# Patient Record
Sex: MALE | Race: WHITE | NOT HISPANIC OR LATINO | URBAN - METROPOLITAN AREA
[De-identification: names, ages, dates, MRNs, and addresses within clinical notes are randomized per-mention and may not be internally consistent; named-entity substitution may affect disease eponyms.]

---

## 2022-08-29 ENCOUNTER — HOSPITAL ENCOUNTER (OUTPATIENT)
Dept: TELEMEDICINE | Facility: HOSPITAL | Age: 52
Discharge: HOME | End: 2022-08-29
Payer: MEDICAID

## 2022-08-29 PROCEDURE — 99203 PR OFFICE/OUTPT VISIT, NEW, LEVL III, 30-44 MIN: ICD-10-PCS | Mod: 95,,, | Performed by: PSYCHIATRY & NEUROLOGY

## 2022-08-29 PROCEDURE — 99203 OFFICE O/P NEW LOW 30 MIN: CPT | Mod: 95,,, | Performed by: PSYCHIATRY & NEUROLOGY

## 2022-08-29 NOTE — SUBJECTIVE & OBJECTIVE
Woke up with symptoms?: no    Recent bleeding noted: no  Does the patient take any Blood Thinners? no  Medications: Antiplatelets:  none      Past Medical History: hypertension and seizures, alcohol abuse    Past Surgical History: no major surgeries within the last 2 weeks    Family History: no relevant history    Social History: drinking    Allergies: Allergies have not been reviewed     Review of Systems   Constitutional: Negative for chills, diaphoresis and fever.   HENT: Negative for hearing loss, tinnitus and trouble swallowing.    Eyes: Negative for visual disturbance.   Respiratory: Negative for shortness of breath.    Cardiovascular: Negative for chest pain and palpitations.   Gastrointestinal: Negative for blood in stool and vomiting.   Endocrine: Negative for cold intolerance.   Genitourinary: Negative for hematuria.   Musculoskeletal: Negative for gait problem and neck pain.   Skin: Negative for pallor and rash.   Neurological: Positive for speech difficulty and headaches. Negative for dizziness, facial asymmetry and weakness.   Hematological: Does not bruise/bleed easily.   Psychiatric/Behavioral: Negative for agitation, behavioral problems and confusion.     Objective:   Vitals: There were no vitals taken for this visit. {[facility]LE[facility] VITALS:91926}    CT READ: Yes  No hemmorhage. No mass effect. No early infarct signs.     Physical Exam  Vitals and nursing note reviewed.   Constitutional:       General: He is not in acute distress.     Appearance: Normal appearance.   HENT:      Head: Normocephalic and atraumatic.      Nose: Nose normal.   Eyes:      Extraocular Movements: Extraocular movements intact.   Cardiovascular:      Rate and Rhythm: Normal rate and regular rhythm.   Pulmonary:      Effort: No respiratory distress.   Abdominal:      General: There is no distension.   Genitourinary:     Comments: na  Musculoskeletal:      Cervical back: Normal range of motion.      Right lower leg: No edema.       Left lower leg: No edema.   Skin:     Findings: No erythema or rash.   Neurological:      Mental Status: He is alert and oriented to person, place, and time.      Cranial Nerves: No cranial nerve deficit.      Sensory: No sensory deficit.      Motor: No weakness.      Coordination: Coordination normal.   Psychiatric:         Mood and Affect: Mood normal.         Behavior: Behavior normal.

## 2022-08-29 NOTE — SUBJECTIVE & OBJECTIVE
Woke up with symptoms?: {YES NO:69246}    Recent bleeding noted: {Yes / No / Unknown:74}  Does the patient take any Blood Thinners? {Yes / No / Unknown:74}  Medications: {[facility] Medications:88614}      Past Medical History: {[facility] MEDICAL HX:27932}    Past Surgical History: {[facility] SURGICAL HX:93697}    Family History: {[facility] MEDICAL HX:27267}    Social History: {[facility] SOCIAL HX:98720}    Allergies: Allergies have not been reviewed {[facility] ALLERGIES:35149}    Review of Systems   Constitutional: Negative for diaphoresis and fever.   HENT: Negative for trouble swallowing.    Eyes: Negative for visual disturbance.   Respiratory: Negative for shortness of breath.    Cardiovascular: Negative for chest pain and palpitations.   Gastrointestinal: Negative for blood in stool and vomiting.   Genitourinary: Negative for hematuria.   Musculoskeletal: Negative for gait problem and neck pain.   Skin: Negative for rash.   Allergic/Immunologic: Negative for immunocompromised state.   Neurological: Positive for speech difficulty and headaches. Negative for weakness.   Hematological: Negative for adenopathy. Does not bruise/bleed easily.   Psychiatric/Behavioral: Negative for agitation, behavioral problems and confusion.     Objective:   Vitals: There were no vitals taken for this visit. {[facility] VITALS:04167}    CT READ: {[facility] CT READ:89904}    Physical Exam  Vitals and nursing note reviewed.   Constitutional:       General: He is not in acute distress.     Appearance: Normal appearance.   HENT:      Head: Normocephalic and atraumatic.      Nose: Nose normal.   Eyes:      Extraocular Movements: Extraocular movements intact.   Cardiovascular:      Rate and Rhythm: Normal rate and regular rhythm.   Pulmonary:      Effort: No respiratory distress.   Abdominal:      General: There is no distension.   Genitourinary:     Comments: na  Musculoskeletal:      Cervical back: Normal range of motion.       Right lower leg: No edema.      Left lower leg: No edema.   Skin:     Findings: No erythema or rash.   Neurological:      Mental Status: He is oriented to person, place, and time.      Cranial Nerves: No cranial nerve deficit.      Sensory: No sensory deficit.      Motor: No weakness.      Coordination: Coordination normal.   Psychiatric:         Mood and Affect: Mood normal.         Behavior: Behavior normal.

## 2022-08-29 NOTE — CONSULTS
"      [facility]  Vascular Neurology  Comprehensive Stroke Center  TeleVascular Neurology Acute Consultation Note      Consults    Consulting Provider: ABY ULLOA  Current Providers  No providers found    Patient Location: [facility] Department  Spoke hospital nurse at bedside with patient assisting consultant.     Patient information was obtained from patient and primary team.         Assessment/Plan:   52 y/o with  HTN, seizures, ETOH abuce, presents with HA and speech changes.  Said that he is an alcoholic trying to quit and since earlier this morning started " feeling very bad, with HA and trouble with my speech".   NIHSS 0, CT head without acute abnormality.  Doubt acute neurovascular etiology for his symptoms, thus will defer further neuro testing at this moment.          Diagnoses: HA. Speech impairment.  No new Assessment & Plan notes have been filed under this hospital service since the last note was generated.  Service: Vascular Neurology      STROKE DOCUMENTATION     Acute Stroke Times:   Acute Stroke Times   Last Known Normal Date: 08/29/22  Last Known Normal Time: 0530  Symptom Onset Date: 08/29/22  Symptom Onset Time: 0530  Stroke Team Called Date: 08/29/22  Stroke Team Called Time: 0840  Stroke Team Arrival Date: 08/29/22  Stroke Team Arrival Time: 0845  CT Interpretation Time: 0845  Alteplase Recommended: No  Thrombectomy Recommended: No    NIH Scale:  Interval: baseline  1a. Level of Consciousness: 0-->Alert, keenly responsive  1b. LOC Questions: 0-->Answers both questions correctly  1c. LOC Commands: 0-->Performs both tasks correctly  2. Best Gaze: 0-->Normal  3. Visual: 0-->No visual loss  4. Facial Palsy: 0-->Normal symmetrical movements  5a. Motor Arm, Left: 0-->No drift, limb holds 90 (or 45) degrees for full 10 secs  5b. Motor Arm, Right: 0-->No drift, limb holds 90 (or 45) degrees for full 10 " "secs  6a. Motor Leg, Left: 0-->No drift, leg holds 30 degree position for full 5 secs  6b. Motor Leg, Right: 0-->No drift, leg holds 30 degree position for full 5 secs  7. Limb Ataxia: 0-->Absent  8. Sensory: 0-->Normal, no sensory loss  9. Best Language: 0-->No aphasia, normal  10. Dysarthria: 0-->Normal  11. Extinction and Inattention (formerly Neglect): 0-->No abnormality  Total (NIH Stroke Scale): 0     Modified Anabella    [place] Coma Scale:15   ABCD2 Score:    [id number] Score:   HAS -BLED Score:   ICH Score:   Hunt & Kimble Classification:       There were no vitals taken for this visit.  Alteplase Eligible?: No  Alteplase Recommendation: Alteplase not recommended due to Suspected stroke mimic   Possible Interventional Revascularization Candidate? No; at this time symptoms not suggestive of large vessel occlusion    Disposition Recommendation: do not transfer    Subjective:     History of Present Illness: 50 y/o with  HTN, seizures, ETOH abuse, presents with HA and speech changes.  Said that he is an alcoholic trying to quit and since earlier this morning started " feeling very bad, with HA and trouble with my speech". Never had similar symptoms before.      Woke up with symptoms?: no     Recent bleeding noted: no  Does the patient take any Blood Thinners? no  Medications: none        Past Medical History: hypertension, seizures, ETOH abuse     Past Surgical History: no major surgeries within the last 2 weeks     Family History: no relevant history     Social History: drinking     Allergies: nkda    CT READ: Yes  No hemmorhage. No mass effect. No early infarct signs.       Review of Systems   Constitutional: Negative for chills, diaphoresis and fever.   HENT: Negative for hearing loss, tinnitus and trouble swallowing.    Eyes: Negative for visual disturbance.   Respiratory: Negative for shortness of breath.    Cardiovascular: Negative for chest pain and palpitations.   Gastrointestinal: Negative for blood in " stool and vomiting.   Endocrine: Negative for cold intolerance.   Genitourinary: Negative for hematuria.   Musculoskeletal: Negative for gait problem and neck pain.   Skin: Negative for pallor and rash.   Allergic/Immunologic: Negative for immunocompromised state.   Neurological: positive for speech difficulty and HA, negative for dizziness, facial asymmetry, weakness and numbness.   Hematological: Does not bruise/bleed easily.   Psychiatric/Behavioral: Negative for agitation, behavioral problems and confusion.        Physical Exam  Vitals and nursing note reviewed.   Constitutional:       General: She is not in acute distress.     Appearance: Normal appearance. She is not diaphoretic.   HENT:      Head: Normocephalic and atraumatic.      Nose: Nose normal.   Eyes:      Extraocular Movements: Extraocular movements intact.   Cardiovascular:      Rate and Rhythm: Normal rate and regular rhythm.   Pulmonary:      Effort: No respiratory distress.   Genitourinary:     Comments: na  Musculoskeletal:      Cervical back: Normal range of motion.      Right lower leg: No edema.      Left lower leg: No edema.   Skin:     Findings: No erythema or rash.   Neurological:      Mental Status: She is alert and oriented to person, place, and time.      Cranial Nerves: No cranial nerve deficit.      Sensory: No sensory deficit.      Coordination: Coordination normal.   Psychiatric:         Mood and Affect: Mood normal.         Behavior: Behavior normal.         No notes on file        Recommended the emergency room physician to have a brief discussion with the patient and/or family if available regarding the  risks and benefits of treatment, and to briefly document the occurrence of that discussion in his clinical encounter note.     The attending portion of this evaluation, treatment, and documentation was performed per Ignacio Benitez MD via audiovisual.    Billing code:  (non-stroke, some mimics)    This patient has  neurological symptom(s)/condition/illness, with minimal potential for morbidity and mortality.  There is a low probability for acute neurological change leading to clinical and possibly life-threatening deterioration requiring highest level of physician preparedness for urgent intervention.  Care was coordinated with other physicians involved in the patient's care.  Radiologic studies and laboratory data were reviewed and interpreted, and plan of care was re-assessed based on the results.  Diagnosis, treatment options and prognosis may have been discussed with the patient and/or family members or caregiver.    In your opinion, this was a: Tier 1 N/A    Consult End Time: 8:55 AM     Ignacio Benitez MD  Comprehensive Stroke Center  Vascular Neurology   [facility]